# Patient Record
Sex: FEMALE | Race: BLACK OR AFRICAN AMERICAN | HISPANIC OR LATINO | URBAN - METROPOLITAN AREA
[De-identification: names, ages, dates, MRNs, and addresses within clinical notes are randomized per-mention and may not be internally consistent; named-entity substitution may affect disease eponyms.]

---
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
## 2024-08-16 NOTE — PROCEDURE: ADDITIONAL NOTES
Patient is requesting medication refill. Please approve or deny this request.    Rx requested:  Requested Prescriptions     Pending Prescriptions Disp Refills    amLODIPine (NORVASC) 10 MG tablet [Pharmacy Med Name: AMLODIPINE BESYLATE 10 MG TAB] 30 tablet 4     Sig: TAKE 1 TABLET BY MOUTH EVERY DAY         Last Office Visit:   4/16/2024      Next Visit Date:  Future Appointments   Date Time Provider Department Center   8/29/2024 10:15 AM Stephan Gould MD [place]       
Detail Level: Detailed
Additional Notes: Advised patient to stop dial soap. Only use water and then gentle cleanser